# Patient Record
Sex: MALE | Race: WHITE | NOT HISPANIC OR LATINO | Employment: OTHER | URBAN - METROPOLITAN AREA
[De-identification: names, ages, dates, MRNs, and addresses within clinical notes are randomized per-mention and may not be internally consistent; named-entity substitution may affect disease eponyms.]

---

## 2024-07-25 ENCOUNTER — APPOINTMENT (OUTPATIENT)
Dept: PRIMARY CARE | Facility: CLINIC | Age: 34
End: 2024-07-25
Payer: MEDICAID

## 2024-07-25 VITALS
OXYGEN SATURATION: 99 % | DIASTOLIC BLOOD PRESSURE: 88 MMHG | SYSTOLIC BLOOD PRESSURE: 128 MMHG | HEART RATE: 81 BPM | HEIGHT: 68 IN | TEMPERATURE: 97.8 F | BODY MASS INDEX: 28.34 KG/M2 | WEIGHT: 187 LBS

## 2024-07-25 DIAGNOSIS — R10.32 LEFT LOWER QUADRANT ABDOMINAL PAIN: ICD-10-CM

## 2024-07-25 DIAGNOSIS — H91.90 HEARING LOSS, UNSPECIFIED HEARING LOSS TYPE, UNSPECIFIED LATERALITY: Primary | ICD-10-CM

## 2024-07-25 PROCEDURE — 1036F TOBACCO NON-USER: CPT | Performed by: FAMILY MEDICINE

## 2024-07-25 PROCEDURE — 3008F BODY MASS INDEX DOCD: CPT | Performed by: FAMILY MEDICINE

## 2024-07-25 PROCEDURE — 99204 OFFICE O/P NEW MOD 45 MIN: CPT | Performed by: FAMILY MEDICINE

## 2024-07-25 ASSESSMENT — ENCOUNTER SYMPTOMS: ABDOMINAL PAIN: 1

## 2024-07-25 ASSESSMENT — LIFESTYLE VARIABLES: HOW OFTEN DO YOU HAVE A DRINK CONTAINING ALCOHOL: 2-4 TIMES A MONTH

## 2024-07-25 NOTE — PROGRESS NOTES
"Subjective   Patient ID: Jesse Lemus is a 34 y.o. male who presents for New Patient Visit (New pt is here for pain left groin ).    Left groin pain. For a month, No known injury. Works construction.          Review of Systems   Gastrointestinal:  Positive for abdominal pain.   All other systems reviewed and are negative.      Objective   /88   Pulse 81   Temp 36.6 °C (97.8 °F)   Ht 1.73 m (5' 8.11\")   Wt 84.8 kg (187 lb)   SpO2 99%   BMI 28.34 kg/m²     Physical Exam  Vitals and nursing note reviewed.   Constitutional:       Appearance: Normal appearance.   HENT:      Head: Normocephalic and atraumatic.      Nose: Nose normal.      Mouth/Throat:      Mouth: Mucous membranes are moist.      Pharynx: Oropharynx is clear.   Eyes:      Extraocular Movements: Extraocular movements intact.      Conjunctiva/sclera: Conjunctivae normal.      Pupils: Pupils are equal, round, and reactive to light.   Cardiovascular:      Rate and Rhythm: Normal rate and regular rhythm.      Pulses: Normal pulses.   Pulmonary:      Effort: Pulmonary effort is normal.      Breath sounds: Normal breath sounds.   Abdominal:      General: Bowel sounds are normal.      Palpations: Abdomen is soft.      Tenderness: There is abdominal tenderness.   Musculoskeletal:         General: Normal range of motion.      Cervical back: Normal range of motion and neck supple.   Skin:     General: Skin is warm and dry.      Capillary Refill: Capillary refill takes less than 2 seconds.   Neurological:      General: No focal deficit present.      Mental Status: He is alert.   Psychiatric:         Mood and Affect: Mood normal.         Behavior: Behavior normal.         Thought Content: Thought content normal.         Judgment: Judgment normal.         Assessment/Plan          "

## 2024-07-29 ENCOUNTER — APPOINTMENT (OUTPATIENT)
Dept: SURGERY | Facility: CLINIC | Age: 34
End: 2024-07-29
Payer: MEDICAID

## 2024-07-29 VITALS
DIASTOLIC BLOOD PRESSURE: 86 MMHG | HEIGHT: 68 IN | SYSTOLIC BLOOD PRESSURE: 136 MMHG | WEIGHT: 187 LBS | HEART RATE: 89 BPM | OXYGEN SATURATION: 99 % | TEMPERATURE: 98.3 F | BODY MASS INDEX: 28.34 KG/M2 | RESPIRATION RATE: 18 BRPM

## 2024-07-29 DIAGNOSIS — S76.212A STRAIN OF LEFT GROIN: Primary | ICD-10-CM

## 2024-07-29 DIAGNOSIS — R10.32 LEFT LOWER QUADRANT ABDOMINAL PAIN: ICD-10-CM

## 2024-07-29 PROCEDURE — 99203 OFFICE O/P NEW LOW 30 MIN: CPT | Performed by: SURGERY

## 2024-07-29 PROCEDURE — 1036F TOBACCO NON-USER: CPT | Performed by: SURGERY

## 2024-07-29 PROCEDURE — 3008F BODY MASS INDEX DOCD: CPT | Performed by: SURGERY

## 2024-07-29 RX ORDER — MELOXICAM 15 MG/1
15 TABLET ORAL DAILY
Qty: 30 TABLET | Refills: 1 | Status: SHIPPED | OUTPATIENT
Start: 2024-07-29 | End: 2024-08-28

## 2024-07-29 NOTE — PROGRESS NOTES
History Of Present Illness :  Jesse Lemus is a 34 y.o. male who presents on referral from Dr. Bernard Nelson for evaluation of left lower abdomen/left groin pain.  The patient was recently seen by Dr. Nelson on 7/25/2024 to establish care.  That note was reviewed.    The patient emigrated from the [facility].  He speaks no English.  During our visit and discussion, the patient used a translation application on his phone for communication.  This worked well.    The patient notes a 1 month history of left groin pain.  This was not precipitated by any particular event activity or lifting episode.  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  The patient specifically denies a left groin bulge or lump.  The patient denies any other symptoms such as nausea vomiting diarrhea constipation change his bowel habits or blood in the stool.  The patient has never had an abdominal operation.  The patient is otherwise in good health.  He has had no major operations.    Patient lives by himself in [place].  He has no family here, but has a number of friends area.    Past Medical History   No past medical history on file.     Surgical History    No past surgical history on file.     Allergies   No Known Allergies     Home Meds  No current outpatient medications     Family History    No family history on file.     Social History  Social History     Tobacco Use    Smoking status: Never    Smokeless tobacco: Never   Substance Use Topics    Alcohol use: Yes    Drug use: Never        Review Of Systems    Review of Systems    General: Not Present- Obesity, Cancer, HIV, MRSA, Recent Cold/Flu, Tired During the Day and VRE.  HEENT: Not Present- Migraine, Cataracts, Glaucoma, Macular Degeneration and Retinal Detachment.  Respiratory:Not Present- Asthma, Chronic Cough, Difficulty Breathing on Exertion, Difficulty Breathing at Rest, Emphysema, Frequent Bronchitis, Home CPAP/BiPAP, Home Oxygen,  Pulmonary Embolus, Pneumonia/TB, Sleep Apnea and Snoring.  Cardiovascular: Not Present- Chest Pain, Congestive Heart Failure, Heart Attack, Coronary Artery Disease, Heart Stent, High Cholesterol/Lipids,Hypertension, Internal Defibrillator, Irregular Heart Beat, Mitral Valve Prolapse, Murmur, Pacemaker and Peripheral Vascular Disease.  Gastrointestinal: Not Present- Heartburn, Hepatitis, Hiatal Hernia, Jaundice, Reflux, Stomach Ulcer and IBS.  Male Genitourinary: Not Present- Enlarged Prostate, Kidney Failure, Kidney Stones, Dialysis and Urinary Tract Infection.  Musculoskeletal: Not Present- Arthritis, Back Pain and Fibromyalgia.  Neurological: Not Present- Headaches, Numbness, Tingling, Seizures, Stroke,  Shunt and Weakness.  Psychiatric: Not Present- Anxiety, Bipolar, Depression and Panic Attacks.  Endocrine: Not Present- Diabetes, Hyperthyroidism, Hypothyroidism and Low Blood Sugar.  Hematology: Not Present- Abnormal Bleeding, Anemia and Blood Clots.    Vitals  There were no vitals taken for this visit.     Physical Exam   Abdominal / Ano-Rectal Physical Exam    General  General Appearance - Not in acute distress.  Well-developed and well-nourished.  Alert and oriented times 3.    Chest and Lung Exam  Auscultation:  Breath sounds: - Normal.  Clear and equal bilaterally.  Adventitious sounds: - No Adventitious sounds.    Cardiovascular  Auscultation: Rate and Rhythm - Regular. Heart Sounds - Normal heart sounds.  No murmurs.    Abdomen  Inspection: Inspection of the abdomen reveals - No Visible peristalsis, No Abnormal pulsations, No Paradoxical  movements and No Hernias.  Palpation/Percussion: Palpation and Percussion of the abdomen reveal - Non Tender, No Rebound tenderness, NoRigidity (guarding) and No Palpable abdominal masses.  Liver: - Normal.  Spleen: - Normal.  Auscultation: Auscultation of the abdomen reveals - Bowel sounds normal and No Abdominal bruits.  Surgical scars:  none    Inguinal and External  Genitalia    The patient was examined supine, and standing, with and without Valsalva. There is no evidence of inguinal or femoral hernia or lymphadenopathy bilaterally. The testes are descended bilaterally and symmetric, with no masses, nodules, or tenderness.  There is very minimal tenderness to the left pubic tubercle.    Rectal  Anorectal Exam:  not examined.      Assessment/Plan   Mr. Lemus has no evidence of inguinal or femoral hernia or lymphadenopathy bilaterally.  He has some mild tenderness to the left pubic tubercle.  There is no left inguinal or abdominal tenderness.    Most likely, this patient has a mild left musculoskeletal inguinal strain.      Recommendations:    Rest if possible    Mobic (meloxicam) 15 mg p.o. daily with food x 30 days, with refill.    Follow-up with me in 2 months for recheck

## 2024-09-20 NOTE — PROGRESS NOTES
Established Patient Visit    Patient presents for 2-month follow-up.  He was seen by me for initial office visit on 7/29/2024 and diagnosed with a left groin strain.  Please see that note for details.    The patient notes that the meloxicam is provided only minimal relief.  He still has discomfort in the medial left groin.  He has noted no groin bulge.  No GI symptoms.  He still continues to work.  He is 30 days the meloxicam but did not refill it.    During our visit and discussion, the patient used a translation application on his phone for communication.    7/29/2024:  Jesse Lemus is a 34 y.o. male who presents on referral from Dr. Bernard Nelson for evaluation of left lower abdomen/left groin pain.  The patient was recently seen by Dr. Nelson on 7/25/2024 to establish care.  That note was reviewed.     The patient emigrated from the [facility].  He speaks no English.  During our visit and discussion, the patient used a translation application on his phone for communication.  This worked well.     The patient notes a 1 month history of left groin pain.  This was not precipitated by any particular event activity or lifting episode.  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  The patient specifically denies a left groin bulge or lump.  The patient denies any other symptoms such as nausea vomiting diarrhea constipation change his bowel habits or blood in the stool.  The patient has never had an abdominal operation.  The patient is otherwise in good health.  He has had no major operations.     Patient lives by himself in [place].  He has no family here, but has a number of friends area.     Past Medical History   Medical History   No past medical history on file.         Surgical History    Surgical History   No past surgical history on file.         Allergies   RX Allergies   No Known Allergies         Home Meds  No current outpatient medications      Family History     Family History   No family history on file.         Social History  Social History   Social History           Tobacco Use    Smoking status: Never    Smokeless tobacco: Never   Substance Use Topics    Alcohol use: Yes    Drug use: Never            Review Of Systems    Review of Systems     General: Not Present- Obesity, Cancer, HIV, MRSA, Recent Cold/Flu, Tired During the Day and VRE.  HEENT: Not Present- Migraine, Cataracts, Glaucoma, Macular Degeneration and Retinal Detachment.  Respiratory:Not Present- Asthma, Chronic Cough, Difficulty Breathing on Exertion, Difficulty Breathing at Rest, Emphysema, Frequent Bronchitis, Home CPAP/BiPAP, Home Oxygen, Pulmonary Embolus, Pneumonia/TB, Sleep Apnea and Snoring.  Cardiovascular: Not Present- Chest Pain, Congestive Heart Failure, Heart Attack, Coronary Artery Disease, Heart Stent, High Cholesterol/Lipids,Hypertension, Internal Defibrillator, Irregular Heart Beat, Mitral Valve Prolapse, Murmur, Pacemaker and Peripheral Vascular Disease.  Gastrointestinal: Not Present- Heartburn, Hepatitis, Hiatal Hernia, Jaundice, Reflux, Stomach Ulcer and IBS.  Male Genitourinary: Not Present- Enlarged Prostate, Kidney Failure, Kidney Stones, Dialysis and Urinary Tract Infection.  Musculoskeletal: Not Present- Arthritis, Back Pain and Fibromyalgia.  Neurological: Not Present- Headaches, Numbness, Tingling, Seizures, Stroke,  Shunt and Weakness.  Psychiatric: Not Present- Anxiety, Bipolar, Depression and Panic Attacks.  Endocrine: Not Present- Diabetes, Hyperthyroidism, Hypothyroidism and Low Blood Sugar.  Hematology: Not Present- Abnormal Bleeding, Anemia and Blood Clots.     Vitals  There were no vitals taken for this visit.      Physical Exam   Abdominal / Ano-Rectal Physical Exam    Allison, my medical assistant, assisted and chaperoned during the examination     General  General Appearance - Not in acute distress.  Well-developed and well-nourished.  Alert and oriented times 3.      Chest and Lung Exam  Auscultation:  Breath sounds: - Normal.  Clear and equal bilaterally.  Adventitious sounds: - No Adventitious sounds.     Cardiovascular  Auscultation: Rate and Rhythm - Regular. Heart Sounds - Normal heart sounds.  No murmurs.     Abdomen  Inspection: Inspection of the abdomen reveals - No Visible peristalsis, No Abnormal pulsations, No Paradoxical  movements and No Hernias.  Palpation/Percussion: Palpation and Percussion of the abdomen reveal - Non Tender, No Rebound tenderness, NoRigidity (guarding) and No Palpable abdominal masses.  Liver: - Normal.  Spleen: - Normal.  Auscultation: Auscultation of the abdomen reveals - Bowel sounds normal and No Abdominal bruits.  Surgical scars:  none     Inguinal and External Genitalia     The patient was examined supine, and standing, with and without Valsalva. There is no evidence of inguinal or femoral hernia or lymphadenopathy bilaterally. The testes are descended bilaterally and symmetric, with no masses, nodules, or tenderness.  There is very minimal tenderness to the left pubic tubercle.     Rectal  Anorectal Exam:  not examined.        Assessment/Plan   Mr. Lemus has no evidence of inguinal or femoral hernia or lymphadenopathy bilaterally.      He continues to have some mild tenderness to the left pubic tubercle.  There is no left inguinal or abdominal tenderness.     Again, most likely, this patient has a mild left musculoskeletal inguinal strain.        Recommendations:     Rest if possible     Refill the Mobic (meloxicam) and take 15 mg p.o. daily with food x 30 days     If no better after 30 days, the patient should return to Dr. Nelson, his primary care physician, for further evaluation; the patient may benefit from either physical therapy or a PM&R (physical medicine and rehabilitation) consultation.    The patient will follow-up with me as needed.

## 2024-09-23 ENCOUNTER — APPOINTMENT (OUTPATIENT)
Dept: SURGERY | Facility: CLINIC | Age: 34
End: 2024-09-23
Payer: MEDICAID

## 2024-09-23 VITALS
TEMPERATURE: 98.7 F | HEART RATE: 60 BPM | OXYGEN SATURATION: 98 % | BODY MASS INDEX: 27.43 KG/M2 | WEIGHT: 181 LBS | DIASTOLIC BLOOD PRESSURE: 80 MMHG | SYSTOLIC BLOOD PRESSURE: 129 MMHG | HEIGHT: 68 IN | RESPIRATION RATE: 17 BRPM

## 2024-09-23 DIAGNOSIS — S76.212A STRAIN OF LEFT GROIN: Primary | ICD-10-CM

## 2024-09-23 PROCEDURE — 1036F TOBACCO NON-USER: CPT | Performed by: SURGERY

## 2024-09-23 PROCEDURE — 3008F BODY MASS INDEX DOCD: CPT | Performed by: SURGERY

## 2024-09-23 PROCEDURE — 99213 OFFICE O/P EST LOW 20 MIN: CPT | Performed by: SURGERY
